# Patient Record
Sex: MALE | Race: WHITE | Employment: FULL TIME | URBAN - METROPOLITAN AREA
[De-identification: names, ages, dates, MRNs, and addresses within clinical notes are randomized per-mention and may not be internally consistent; named-entity substitution may affect disease eponyms.]

---

## 2023-11-07 ENCOUNTER — HOSPITAL ENCOUNTER (EMERGENCY)
Facility: HOSPITAL | Age: 38
Discharge: HOME | End: 2023-11-07
Attending: STUDENT IN AN ORGANIZED HEALTH CARE EDUCATION/TRAINING PROGRAM
Payer: OTHER MISCELLANEOUS

## 2023-11-07 ENCOUNTER — APPOINTMENT (OUTPATIENT)
Dept: GENERAL RADIOLOGY | Facility: HOSPITAL | Age: 38
End: 2023-11-07
Payer: OTHER MISCELLANEOUS

## 2023-11-07 VITALS
RESPIRATION RATE: 18 BRPM | BODY MASS INDEX: 28.25 KG/M2 | SYSTOLIC BLOOD PRESSURE: 145 MMHG | OXYGEN SATURATION: 97 % | WEIGHT: 180 LBS | HEART RATE: 72 BPM | TEMPERATURE: 98 F | DIASTOLIC BLOOD PRESSURE: 86 MMHG | HEIGHT: 67 IN

## 2023-11-07 DIAGNOSIS — S40.012A CONTUSION OF LEFT SHOULDER, INITIAL ENCOUNTER: Primary | ICD-10-CM

## 2023-11-07 PROCEDURE — 99283 EMERGENCY DEPT VISIT LOW MDM: CPT

## 2023-11-07 PROCEDURE — 73030 X-RAY EXAM OF SHOULDER: CPT

## 2023-11-07 PROCEDURE — 99284 EMERGENCY DEPT VISIT MOD MDM: CPT

## 2023-11-07 RX ORDER — ACETAMINOPHEN 500 MG
1000 TABLET ORAL ONCE
Status: COMPLETED | OUTPATIENT
Start: 2023-11-07 | End: 2023-11-07

## 2023-11-07 RX ORDER — IBUPROFEN 600 MG/1
600 TABLET ORAL EVERY 8 HOURS PRN
Qty: 21 TABLET | Refills: 0 | Status: SHIPPED | OUTPATIENT
Start: 2023-11-07 | End: 2023-11-14

## 2023-11-07 RX ORDER — IBUPROFEN 600 MG/1
600 TABLET ORAL ONCE
Status: COMPLETED | OUTPATIENT
Start: 2023-11-07 | End: 2023-11-07

## 2023-11-07 NOTE — ED INITIAL ASSESSMENT (HPI)
Pt arrives ambulatory, pt slipped in the freezer fell on left side. Having pain to left shoulder.  A&O x4

## 2023-11-07 NOTE — DISCHARGE INSTRUCTIONS
[address] [place] [address] Chandler DIAZ  Dept: [phone]  Dept Fax: [phone]     Occupational restrictions  For: Nestora Brim    No lifting objects greater than 5 lbs  No work with left arm  Normal leg activities  Normal back activities  No wound  -

## 2024-06-07 ENCOUNTER — HOSPITAL ENCOUNTER (EMERGENCY)
Facility: HOSPITAL | Age: 39
Discharge: HOME | End: 2024-06-07
Attending: EMERGENCY MEDICINE

## 2024-06-07 ENCOUNTER — APPOINTMENT (OUTPATIENT)
Dept: GENERAL RADIOLOGY | Facility: HOSPITAL | Age: 39
End: 2024-06-07
Attending: EMERGENCY MEDICINE

## 2024-06-07 VITALS
OXYGEN SATURATION: 95 % | DIASTOLIC BLOOD PRESSURE: 83 MMHG | BODY MASS INDEX: 31.5 KG/M2 | SYSTOLIC BLOOD PRESSURE: 127 MMHG | WEIGHT: 196 LBS | HEIGHT: 66 IN | TEMPERATURE: 99 F | HEART RATE: 69 BPM | RESPIRATION RATE: 22 BRPM

## 2024-06-07 DIAGNOSIS — R07.9 CHEST PAIN OF UNCERTAIN ETIOLOGY: Primary | ICD-10-CM

## 2024-06-07 LAB
ALBUMIN SERPL-MCNC: 4.8 G/DL (ref 3.2–4.8)
ALBUMIN/GLOB SERPL: 1.7 {RATIO} (ref 1–2)
ALP LIVER SERPL-CCNC: 85 U/L
ALT SERPL-CCNC: 118 U/L
ANION GAP SERPL CALC-SCNC: 8 MMOL/L (ref 0–18)
AST SERPL-CCNC: 61 U/L (ref ?–34)
BASOPHILS # BLD AUTO: 0.07 X10(3) UL (ref 0–0.2)
BASOPHILS NFR BLD AUTO: 0.8 %
BILIRUB SERPL-MCNC: 1 MG/DL (ref 0.3–1.2)
BUN BLD-MCNC: 21 MG/DL (ref 9–23)
BUN/CREAT SERPL: 17.8 (ref 10–20)
CALCIUM BLD-MCNC: 9.3 MG/DL (ref 8.7–10.4)
CHLORIDE SERPL-SCNC: 109 MMOL/L (ref 98–112)
CO2 SERPL-SCNC: 26 MMOL/L (ref 21–32)
CREAT BLD-MCNC: 1.18 MG/DL
DEPRECATED RDW RBC AUTO: 39.5 FL (ref 35.1–46.3)
EGFRCR SERPLBLD CKD-EPI 2021: 80 ML/MIN/1.73M2 (ref 60–?)
EOSINOPHIL # BLD AUTO: 0.44 X10(3) UL (ref 0–0.7)
EOSINOPHIL NFR BLD AUTO: 5.3 %
ERYTHROCYTE [DISTWIDTH] IN BLOOD BY AUTOMATED COUNT: 12.3 % (ref 11–15)
GLOBULIN PLAS-MCNC: 2.8 G/DL (ref 2–3.5)
GLUCOSE BLD-MCNC: 106 MG/DL (ref 70–99)
HCT VFR BLD AUTO: 49.2 %
HGB BLD-MCNC: 16.6 G/DL
IMM GRANULOCYTES # BLD AUTO: 0.05 X10(3) UL (ref 0–1)
IMM GRANULOCYTES NFR BLD: 0.6 %
LYMPHOCYTES # BLD AUTO: 2.7 X10(3) UL (ref 1–4)
LYMPHOCYTES NFR BLD AUTO: 32.7 %
MCH RBC QN AUTO: 29.6 PG (ref 26–34)
MCHC RBC AUTO-ENTMCNC: 33.7 G/DL (ref 31–37)
MCV RBC AUTO: 87.9 FL
MONOCYTES # BLD AUTO: 0.48 X10(3) UL (ref 0.1–1)
MONOCYTES NFR BLD AUTO: 5.8 %
NEUTROPHILS # BLD AUTO: 4.52 X10 (3) UL (ref 1.5–7.7)
NEUTROPHILS # BLD AUTO: 4.52 X10(3) UL (ref 1.5–7.7)
NEUTROPHILS NFR BLD AUTO: 54.8 %
OSMOLALITY SERPL CALC.SUM OF ELEC: 299 MOSM/KG (ref 275–295)
PLATELET # BLD AUTO: 251 10(3)UL (ref 150–450)
POTASSIUM SERPL-SCNC: 3.6 MMOL/L (ref 3.5–5.1)
PROT SERPL-MCNC: 7.6 G/DL (ref 5.7–8.2)
RBC # BLD AUTO: 5.6 X10(6)UL
SODIUM SERPL-SCNC: 143 MMOL/L (ref 136–145)
TROPONIN I SERPL HS-MCNC: 38 NG/L
WBC # BLD AUTO: 8.3 X10(3) UL (ref 4–11)

## 2024-06-07 PROCEDURE — 93005 ELECTROCARDIOGRAM TRACING: CPT

## 2024-06-07 PROCEDURE — 85025 COMPLETE CBC W/AUTO DIFF WBC: CPT | Performed by: EMERGENCY MEDICINE

## 2024-06-07 PROCEDURE — 99285 EMERGENCY DEPT VISIT HI MDM: CPT

## 2024-06-07 PROCEDURE — 71045 X-RAY EXAM CHEST 1 VIEW: CPT | Performed by: EMERGENCY MEDICINE

## 2024-06-07 PROCEDURE — 93010 ELECTROCARDIOGRAM REPORT: CPT

## 2024-06-07 PROCEDURE — 84484 ASSAY OF TROPONIN QUANT: CPT | Performed by: EMERGENCY MEDICINE

## 2024-06-07 PROCEDURE — 36415 COLL VENOUS BLD VENIPUNCTURE: CPT

## 2024-06-07 PROCEDURE — 80053 COMPREHEN METABOLIC PANEL: CPT | Performed by: EMERGENCY MEDICINE

## 2024-06-07 PROCEDURE — 99284 EMERGENCY DEPT VISIT MOD MDM: CPT

## 2024-06-08 LAB
ATRIAL RATE: 74 BPM
P AXIS: 56 DEGREES
P-R INTERVAL: 164 MS
Q-T INTERVAL: 360 MS
QRS DURATION: 96 MS
QTC CALCULATION (BEZET): 399 MS
R AXIS: 48 DEGREES
T AXIS: 34 DEGREES
VENTRICULAR RATE: 74 BPM

## 2024-06-08 NOTE — ED PROVIDER NOTES
Patient Seen in: [facility] Emergency Department    History     Chief Complaint   Patient presents with    Chest Pain       HPI    The patient presents to the ED complaining of left-sided chest pain that started several hours ago at work.  He states symptoms worsened around 30 minutes ago and he developed dizziness and pain radiating to his left arm.  He states symptoms now somewhat improved on ED arrival.  Denies any cardiac history or risk factors.  He states he was on exerting himself when the symptoms started.  No other complaints.    History reviewed. History reviewed. No pertinent past medical history.    History reviewed. History reviewed. No pertinent surgical history.      Medications :  (Not in a hospital admission)       No family history on file.    Smoking Status:   Social History     Socioeconomic History    Marital status:    Tobacco Use    Smoking status: Never     Passive exposure: Never    Smokeless tobacco: Never   Vaping Use    Vaping status: Never Used   Substance and Sexual Activity    Alcohol use: Yes     Comment: social    Drug use: Never       Constitutional and vital signs reviewed.      Social History and Family History elements reviewed from today, pertinent positives to the presenting problem noted.    Physical Exam     ED Triage Vitals [06/07/24 2005]   /84   Pulse 78   Resp 20   Temp 98.6 °F (37 °C)   Temp src Temporal   SpO2 98 %   O2 Device None (Room air)       All measures to prevent infection transmission during my interaction with the patient were taken. Handwashing was performed prior to and after the exam.  Stethoscope and any equipment used during my examination was cleaned with super sani-cloth germicidal wipes following the exam.     Physical Exam  Vitals and nursing note reviewed.   Constitutional:       General: He is not in acute distress.     Appearance: He is well-developed. He is not ill-appearing or toxic-appearing.   HENT:      Head:  Normocephalic and atraumatic.   Eyes:      General:         Right eye: No discharge.         Left eye: No discharge.      Conjunctiva/sclera: Conjunctivae normal.   Neck:      Trachea: No tracheal deviation.   Cardiovascular:      Rate and Rhythm: Normal rate and regular rhythm.      Heart sounds: Normal heart sounds.   Pulmonary:      Effort: Pulmonary effort is normal. No respiratory distress.      Breath sounds: Normal breath sounds. No stridor.   Chest:      Chest wall: Tenderness present.      Comments: Tenderness to palpation of the left upper pectoralis muscle which reproduces pain symptoms.  Abdominal:      General: There is no distension.      Palpations: Abdomen is soft.   Musculoskeletal:         General: No deformity.   Skin:     General: Skin is warm and dry.   Neurological:      Mental Status: He is alert and oriented to person, place, and time.   Psychiatric:         Mood and Affect: Mood normal.         Behavior: Behavior normal.         ED Course        Labs Reviewed   COMP METABOLIC PANEL (14) - Abnormal; Notable for the following components:       Result Value    Glucose 106 (*)     Calculated Osmolality 299 (*)      (*)     AST 61 (*)     All other components within normal limits   TROPONIN I HIGH SENSITIVITY - Normal   CBC WITH DIFFERENTIAL WITH PLATELET    Narrative:     The following orders were created for panel order CBC With Differential With Platelet.                  Procedure                               Abnormality         Status                                     ---------                               -----------         ------                                     CBC W/ DIFFERENTIAL[[id number]]                              Final result                                                 Please view results for these tests on the individual orders.   CBC W/ DIFFERENTIAL     EKG    Rate, intervals and axes as noted on EKG Report.  Rate: Normal, 74 bpm  Rhythm: Sinus Rhythm  Reading:  Normal intervals, normal ST segments, normal EKG           As Interpreted by me    Imaging Results Available and Reviewed while in ED: XR CHEST AP PORTABLE  (CPT=71045)    Result Date: 6/7/2024  CONCLUSION:   Mild prominence of the interstitial markings, which may be secondary to low lung volumes or minimal pulmonary edema.  No focal opacity, pleural effusion, or pneumothorax.     Dictated by (CST): Eduardo Meadows MD on 6/07/2024 at 8:43 PM     Finalized by (CST): Eduardo Meadows MD on 6/07/2024 at 8:45 PM         ED Medications Administered: Medications - No data to display      MDM     Vitals:    06/07/24 2005 06/07/24 2130   BP: 133/84 127/83   Pulse: 78 69   Resp: 20 22   Temp: 98.6 °F (37 °C)    TempSrc: Temporal    SpO2: 98% 95%   Weight: 88.9 kg    Height: 167.6 cm (5' 6\")      *I personally reviewed and interpreted all ED vitals.    Pulse Ox: 95%, Room air, Normal     Monitor Interpretation:   normal sinus rhythm as interpreted by me.  The cardiac monitor was ordered to monitor heart rate.    Differential Diagnosis/ Diagnostic Considerations: ACS, atypical chest pain, pneumonia, pneumothorax, PE, other    Complicating Factors: The patient already has does not have a problem list on file. to contribute to the complexity of this ED evaluation.    Medical Decision Making  Patient presents to the ED with chest pain starting earlier tonight at work.  Nondistressed on evaluation.  Pain reproducible with palpation of the chest wall.  No concern clinically for ACS given atypical symptoms, normal cardiac workup in the ED and low heart score.  No concern for PE clinically, PERC negative and chest x-ray without concerning findings.  Patient reassured and stable for discharge with outpatient follow-up.    Problems Addressed:  Chest pain of uncertain etiology: acute illness or injury that poses a threat to life or bodily functions    Amount and/or Complexity of Data Reviewed  Labs: ordered. Decision-making details  documented in ED Course.  Radiology: ordered and independent interpretation performed. Decision-making details documented in ED Course.     Details: I personally reviewed the patient's chest x-ray image and noted no pneumothorax or pneumonia  ECG/medicine tests: ordered and independent interpretation performed. Decision-making details documented in ED Course.        Condition upon leaving the department: Stable    Disposition and Plan     Clinical Impression:  1. Chest pain of uncertain etiology        Disposition:  Discharge    Follow-up:  Kenroy Billings, DO  [address] [place] [id number]  [phone]    Schedule an appointment as soon as possible for a visit in 3 day(s)        Medications Prescribed:  There are no discharge medications for this patient.

## 2024-06-08 NOTE — ED QUICK NOTES
Patient verbalizes understanding of discharge and follow-up instructions, ambulated steady gait to exit.

## 2024-06-08 NOTE — ED INITIAL ASSESSMENT (HPI)
To ED with c/o left side chest pain, dizziness and left arm pain that started about 30 min ago. Patient denies cardiac history. Patient states having difficulty catching his breath.

## (undated) NOTE — LETTER
Date & Time: 11/7/2023, 2:55 PM  Patient: Marleen Lugo  Encounter Provider(s):    Jeri Robert MD       To Whom It May Concern:    Marleen Lugo was seen and treated in our department on 11/7/2023. He can return to work with these limitations: No lifting objects greater than 5 pounds, no work with left arm .      If you have any questions or concerns, please do not hesitate to call.        _____________________________  Physician/APC Signature